# Patient Record
Sex: FEMALE | Race: BLACK OR AFRICAN AMERICAN | Employment: FULL TIME | URBAN - METROPOLITAN AREA
[De-identification: names, ages, dates, MRNs, and addresses within clinical notes are randomized per-mention and may not be internally consistent; named-entity substitution may affect disease eponyms.]

---

## 2023-05-31 ENCOUNTER — HOSPITAL ENCOUNTER (EMERGENCY)
Age: 33
Discharge: HOME | End: 2023-05-31
Payer: OTHER MISCELLANEOUS

## 2023-05-31 VITALS
WEIGHT: 115.3 LBS | OXYGEN SATURATION: 98 % | BODY MASS INDEX: 22.64 KG/M2 | HEIGHT: 60 IN | HEART RATE: 81 BPM | DIASTOLIC BLOOD PRESSURE: 69 MMHG | SYSTOLIC BLOOD PRESSURE: 111 MMHG | TEMPERATURE: 98.2 F | RESPIRATION RATE: 16 BRPM

## 2023-05-31 DIAGNOSIS — S39.012A LUMBAR STRAIN, INITIAL ENCOUNTER: Primary | ICD-10-CM
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
## 2023-05-31 DIAGNOSIS — S40.012A CONTUSION OF LEFT SHOULDER, INITIAL ENCOUNTER: ICD-10-CM

## 2023-05-31 PROCEDURE — 99283 EMERGENCY DEPT VISIT LOW MDM: CPT

## 2023-05-31 RX ORDER — METHOCARBAMOL 500 MG/1
500 TABLET, FILM COATED ORAL EVERY 8 HOURS PRN
Qty: 12 TABLET | Refills: 0 | Status: SHIPPED | OUTPATIENT
Start: 2023-05-31 | End: 2023-06-04

## 2023-05-31 ASSESSMENT — PAIN - FUNCTIONAL ASSESSMENT: PAIN_FUNCTIONAL_ASSESSMENT: NONE - DENIES PAIN

## 2023-05-31 ASSESSMENT — PAIN SCALES - GENERAL: PAINLEVEL_OUTOF10: 6

## 2023-05-31 ASSESSMENT — PAIN DESCRIPTION - ORIENTATION: ORIENTATION: LEFT

## 2023-05-31 ASSESSMENT — PAIN DESCRIPTION - LOCATION: LOCATION: BACK;SHOULDER
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
## 2023-05-31 NOTE — ED NOTES
D/C: Order noted for d/c. Pt confirmed d/c paperwork  have correct name. Discharge and education instructions reviewed with patient. Teach-back successful. Pt verbalized understanding and signed d/c papers. Pt denied questions at this time. No acute distress noted. Patient instructed to follow-up as noted - return to emergency department if symptoms worsen. Patient verbalized understanding. Discharged per EDMD with discharge instructions. Pt discharged to private vehicle. Patient stable upon departure. Thanked patient for choosing [facility]) for care. Provider aware of patient pain at time of discharge.        Cain Mandujano LPN  23/01/37 6121

## 2023-06-08 ENCOUNTER — HOSPITAL ENCOUNTER (EMERGENCY)
Age: 33
Discharge: HOME | End: 2023-06-08
Payer: OTHER MISCELLANEOUS

## 2023-06-08 ENCOUNTER — APPOINTMENT (OUTPATIENT)
Dept: GENERAL RADIOLOGY | Age: 33
End: 2023-06-08
Payer: OTHER MISCELLANEOUS

## 2023-06-08 VITALS
HEART RATE: 65 BPM | HEIGHT: 60 IN | RESPIRATION RATE: 15 BRPM | SYSTOLIC BLOOD PRESSURE: 113 MMHG | WEIGHT: 128.53 LBS | DIASTOLIC BLOOD PRESSURE: 86 MMHG | TEMPERATURE: 97.7 F | BODY MASS INDEX: 25.23 KG/M2 | OXYGEN SATURATION: 100 %

## 2023-06-08 DIAGNOSIS — M62.838 TRAPEZIUS MUSCLE SPASM: Primary | ICD-10-CM

## 2023-06-08 LAB — HCG UR QL: NEGATIVE

## 2023-06-08 PROCEDURE — 73010 X-RAY EXAM OF SHOULDER BLADE: CPT

## 2023-06-08 PROCEDURE — 84703 CHORIONIC GONADOTROPIN ASSAY: CPT

## 2023-06-08 PROCEDURE — 6370000000 HC RX 637 (ALT 250 FOR IP): Performed by: NURSE PRACTITIONER

## 2023-06-08 RX ORDER — ACETAMINOPHEN 500 MG
1000 TABLET ORAL 3 TIMES DAILY PRN
Qty: 180 TABLET | Refills: 0 | Status: SHIPPED | OUTPATIENT
Start: 2023-06-08

## 2023-06-08 RX ORDER — CYCLOBENZAPRINE HCL 10 MG
10 TABLET ORAL ONCE
Status: COMPLETED | OUTPATIENT
Start: 2023-06-08 | End: 2023-06-08

## 2023-06-08 RX ORDER — LIDOCAINE 4 G/G
1 PATCH TOPICAL ONCE
Status: DISCONTINUED | OUTPATIENT
Start: 2023-06-08 | End: 2023-06-08 | Stop reason: HOSPADM

## 2023-06-08 RX ORDER — LIDOCAINE 50 MG/G
1 PATCH TOPICAL DAILY
Qty: 10 PATCH | Refills: 0 | Status: SHIPPED | OUTPATIENT
Start: 2023-06-08 | End: 2023-06-18

## 2023-06-08 RX ORDER — IBUPROFEN 800 MG/1
800 TABLET ORAL EVERY 8 HOURS PRN
Qty: 20 TABLET | Refills: 0 | Status: SHIPPED | OUTPATIENT
Start: 2023-06-08

## 2023-06-08 RX ORDER — CYCLOBENZAPRINE HCL 10 MG
10 TABLET ORAL 3 TIMES DAILY PRN
Qty: 21 TABLET | Refills: 0 | Status: SHIPPED | OUTPATIENT
Start: 2023-06-08 | End: 2023-06-18

## 2023-06-08 RX ORDER — ACETAMINOPHEN 500 MG
1000 TABLET ORAL ONCE
Status: COMPLETED | OUTPATIENT
Start: 2023-06-08 | End: 2023-06-08

## 2023-06-08 RX ORDER — IBUPROFEN 400 MG/1
800 TABLET ORAL ONCE
Status: COMPLETED | OUTPATIENT
Start: 2023-06-08 | End: 2023-06-08

## 2023-06-08 RX ADMIN — ACETAMINOPHEN 1000 MG: 500 TABLET ORAL at 12:56

## 2023-06-08 RX ADMIN — IBUPROFEN 800 MG: 400 TABLET, FILM COATED ORAL at 12:55

## 2023-06-08 RX ADMIN — CYCLOBENZAPRINE 10 MG: 10 TABLET, FILM COATED ORAL at 12:55

## 2023-06-08 ASSESSMENT — ENCOUNTER SYMPTOMS
SORE THROAT: 0
DIARRHEA: 0
ABDOMINAL PAIN: 0
BACK PAIN: 0
NAUSEA: 0
EYE PAIN: 0
VOMITING: 0
SHORTNESS OF BREATH: 0
COUGH: 0

## 2023-06-08 ASSESSMENT — PAIN SCALES - GENERAL
PAINLEVEL_OUTOF10: 2
PAINLEVEL_OUTOF10: 8

## 2023-06-08 ASSESSMENT — PAIN DESCRIPTION - LOCATION: LOCATION: SHOULDER

## 2023-06-08 ASSESSMENT — PAIN DESCRIPTION - ORIENTATION: ORIENTATION: LEFT

## 2023-06-08 NOTE — ED NOTES
Discharge and education instructions reviewed. Patient verbalized understanding, teach-back successful. Patient denied questions at this time. No acute distress noted. Patient instructed to follow-up as noted - return to emergency department if symptoms worsen. Patient verbalized understanding. Discharged per EDMD with discharge instructions.         Karina Cameron RN  06/08/23 7676

## 2023-06-08 NOTE — ED PROVIDER NOTES
well      I am the Primary Clinician of Record. FINAL IMPRESSION      1.  Trapezius muscle spasm          DISPOSITION/PLAN     DISPOSITION Decision To Discharge 06/08/2023 02:05:09 PM      PATIENT REFERRED TO:  Callie Ashley [address]  Schedule an appointment as soon as possible for a visit in 1 week  As needed, If symptoms worsen    [facility] Emergency Department  [address] [address]  [phone]  Go to   As needed, If symptoms worsen    [facility]) Pre-Services  [phone]          DISCHARGE MEDICATIONS:  Discharge Medication List as of 6/8/2023  2:17 PM        START taking these medications    Details   acetaminophen (TYLENOL) 500 MG tablet Take 2 tablets by mouth 3 times daily as needed for Pain, Disp-180 tablet, R-0Normal      ibuprofen (IBU) 800 MG tablet Take 1 tablet by mouth every 8 hours as needed for Pain, Disp-20 tablet, R-0Normal      lidocaine (LIDODERM) 5 % Place 1 patch onto the skin daily for 10 days 12 hours on, 12 hours off., Disp-10 patch, R-0Normal      cyclobenzaprine (FLEXERIL) 10 MG tablet Take 1 tablet by mouth 3 times daily as needed for Muscle spasms, Disp-21 tablet, R-0Normal             DISCONTINUED MEDICATIONS:  Discharge Medication List as of 6/8/2023  2:17 PM                 (Please note that portions of this note were completed with a voice recognition program.  Efforts were made to edit the dictations but occasionally words are mis-transcribed.)    ALICE Mccollum CNP (electronically signed)            ALICE Mccollum CNP  06/08/23 1423
self-care/home management
self-care/home management

## 2023-06-20 ENCOUNTER — OFFICE VISIT (OUTPATIENT)
Dept: ORTHOPEDIC SURGERY | Age: 33
End: 2023-06-20

## 2023-06-20 DIAGNOSIS — M54.12 CERVICAL RADICULAR PAIN: ICD-10-CM
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
## 2023-06-20 DIAGNOSIS — M54.9 ACUTE BACK PAIN, UNSPECIFIED BACK LOCATION, UNSPECIFIED BACK PAIN LATERALITY: ICD-10-CM

## 2023-06-20 DIAGNOSIS — M54.2 NECK PAIN: Primary | ICD-10-CM

## 2023-06-20 RX ORDER — PREDNISONE 10 MG/1
10 TABLET ORAL DAILY
Qty: 10 TABLET | Refills: 0 | Status: SHIPPED | OUTPATIENT
Start: 2023-06-20 | End: 2023-06-30
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
## 2023-08-12 ENCOUNTER — HOSPITAL ENCOUNTER (EMERGENCY)
Age: 33
Discharge: LEFT BEFORE DISCHARGE | End: 2023-08-12

## 2024-10-15 ENCOUNTER — HOSPITAL ENCOUNTER (EMERGENCY)
Age: 34
Discharge: HOME | End: 2024-10-15
Attending: STUDENT IN AN ORGANIZED HEALTH CARE EDUCATION/TRAINING PROGRAM

## 2024-10-15 VITALS
RESPIRATION RATE: 16 BRPM | HEIGHT: 60 IN | TEMPERATURE: 98.4 F | HEART RATE: 87 BPM | WEIGHT: 113.6 LBS | DIASTOLIC BLOOD PRESSURE: 95 MMHG | SYSTOLIC BLOOD PRESSURE: 112 MMHG | OXYGEN SATURATION: 96 % | BODY MASS INDEX: 22.3 KG/M2

## 2024-10-15 DIAGNOSIS — R51.9 ACUTE NONINTRACTABLE HEADACHE, UNSPECIFIED HEADACHE TYPE: Primary | ICD-10-CM

## 2024-10-15 PROCEDURE — 6370000000 HC RX 637 (ALT 250 FOR IP): Performed by: STUDENT IN AN ORGANIZED HEALTH CARE EDUCATION/TRAINING PROGRAM

## 2024-10-15 PROCEDURE — 99283 EMERGENCY DEPT VISIT LOW MDM: CPT

## 2024-10-15 RX ORDER — ACETAMINOPHEN 325 MG/1
650 TABLET ORAL ONCE
Status: COMPLETED | OUTPATIENT
Start: 2024-10-15 | End: 2024-10-15

## 2024-10-15 RX ORDER — IBUPROFEN 400 MG/1
800 TABLET, FILM COATED ORAL ONCE
Status: COMPLETED | OUTPATIENT
Start: 2024-10-15 | End: 2024-10-15

## 2024-10-15 RX ORDER — OLANZAPINE 5 MG/1
5 TABLET, ORALLY DISINTEGRATING ORAL ONCE
Status: COMPLETED | OUTPATIENT
Start: 2024-10-15 | End: 2024-10-15

## 2024-10-15 RX ADMIN — ACETAMINOPHEN 650 MG: 325 TABLET ORAL at 07:43

## 2024-10-15 RX ADMIN — OLANZAPINE 5 MG: 5 TABLET, ORALLY DISINTEGRATING ORAL at 07:43

## 2024-10-15 RX ADMIN — IBUPROFEN 800 MG: 400 TABLET, FILM COATED ORAL at 07:43

## 2024-10-15 ASSESSMENT — PAIN SCALES - GENERAL
PAINLEVEL_OUTOF10: 0
PAINLEVEL_OUTOF10: 0
PAINLEVEL_OUTOF10: 8

## 2024-10-15 ASSESSMENT — PAIN DESCRIPTION - ONSET: ONSET: ON-GOING

## 2024-10-15 ASSESSMENT — PAIN - FUNCTIONAL ASSESSMENT: PAIN_FUNCTIONAL_ASSESSMENT: 0-10

## 2024-10-15 ASSESSMENT — PAIN DESCRIPTION - FREQUENCY: FREQUENCY: CONTINUOUS

## 2024-10-15 ASSESSMENT — PAIN DESCRIPTION - LOCATION: LOCATION: HEAD

## 2024-10-15 NOTE — ED PROVIDER NOTES
THE [facility]  EMERGENCY DEPARTMENT ENCOUNTER          ATTENDING PHYSICIAN NOTE       Date of evaluation: 10/15/2024    Chief Complaint     Headache (Pt c/o headache for the past 3-4 days. Pt tried OTC Tylenol and Excedrin. Hasn't taken meds since yesterday.)      History of Present Illness     Charmayne Hill is a 33 y.o. female who presents presenting with 3 to 4 days of frontal headache.  She states that her family members help get migraines but she has not been formally diagnosed.  She has been having intermittent headaches for the last month and this particular 1 has lasted for about 3 to 4 days.  It seems to go away when she goes to sleep and then is present for the duration of the day.  The pain is in the front of her forehead and does not radiate.  She does complain of photophobia and phonophobia and mild nausea without vomiting.  She denies fevers or chills, blurry vision, chest pain, shortness of breath, or abdominal pain.  She tried taking Tylenol and Excedrin yesterday with minimal improvement    ASSESSMENT / PLAN  (MEDICAL DECISION MAKING)     INITIAL VITALS: BP: (!) 140/92, Temp: 98.4 °F (36.9 °C), Pulse: 71, Respirations: 16, SpO2: 100 %      Charmayne Hill is a 33 y.o. female presenting with 3 to 4 days of frontal headache.    Differential diagnosis includes but is not limited to tension headache, migraine headache, SAH.    Given the chronology of her headache and the fact that has been waxing waning for 3 or 4 days without any neurologic deficit or high risk features, low suspicion for SAH.  No indication for CT imaging today.    Suspect migraine or tension headache.  Will treat with Tylenol, ibuprofen, and p.o. Zydus as well as increase p.o. hydration.    On reassessment, patient reports significant improvement in her symptoms.  She will be discharged home and encouraged to take Tylenol and ibuprofen on a scheduled basis and continue her increased p.o. hydration.  Recommend she follow-up

## 2024-10-15 NOTE — ED NOTES
All discharge paperwork and follow-up instructions reviewed with pt. Pt verbalized understanding. Pt ambulatory upon discharge in stable condition.     Ely Sharpe RN  10/15/24 9264

## 2024-10-23 ENCOUNTER — OFFICE VISIT (OUTPATIENT)
Dept: INTERNAL MEDICINE CLINIC | Age: 34
End: 2024-10-23
Payer: COMMERCIAL

## 2024-10-23 VITALS
OXYGEN SATURATION: 98 % | HEART RATE: 91 BPM | RESPIRATION RATE: 20 BRPM | WEIGHT: 116.8 LBS | TEMPERATURE: 98.2 F | SYSTOLIC BLOOD PRESSURE: 120 MMHG | BODY MASS INDEX: 22.93 KG/M2 | DIASTOLIC BLOOD PRESSURE: 76 MMHG | HEIGHT: 60 IN

## 2024-10-23 DIAGNOSIS — Z12.4 SCREENING FOR CERVICAL CANCER: ICD-10-CM

## 2024-10-23 DIAGNOSIS — G43.809 OTHER MIGRAINE WITHOUT STATUS MIGRAINOSUS, NOT INTRACTABLE: ICD-10-CM

## 2024-10-23 DIAGNOSIS — Z13.9 SCREENING DUE: ICD-10-CM

## 2024-10-23 PROCEDURE — 99213 OFFICE O/P EST LOW 20 MIN: CPT

## 2024-10-23 RX ORDER — SUMATRIPTAN 100 MG/1
50 TABLET, FILM COATED ORAL 2 TIMES DAILY PRN
Qty: 9 TABLET | Refills: 0 | Status: SHIPPED | OUTPATIENT
Start: 2024-10-23

## 2024-10-23 SDOH — ECONOMIC STABILITY: FOOD INSECURITY: WITHIN THE PAST 12 MONTHS, YOU WORRIED THAT YOUR FOOD WOULD RUN OUT BEFORE YOU GOT MONEY TO BUY MORE.: SOMETIMES TRUE

## 2024-10-23 SDOH — ECONOMIC STABILITY: FOOD INSECURITY: WITHIN THE PAST 12 MONTHS, THE FOOD YOU BOUGHT JUST DIDN'T LAST AND YOU DIDN'T HAVE MONEY TO GET MORE.: OFTEN TRUE

## 2024-10-23 SDOH — ECONOMIC STABILITY: INCOME INSECURITY: HOW HARD IS IT FOR YOU TO PAY FOR THE VERY BASICS LIKE FOOD, HOUSING, MEDICAL CARE, AND HEATING?: HARD

## 2024-10-23 SDOH — ECONOMIC STABILITY: TRANSPORTATION INSECURITY
IN THE PAST 12 MONTHS, HAS LACK OF TRANSPORTATION KEPT YOU FROM MEETINGS, WORK, OR FROM GETTING THINGS NEEDED FOR DAILY LIVING?: NO

## 2024-10-23 ASSESSMENT — ENCOUNTER SYMPTOMS
NAUSEA: 0
EYE DISCHARGE: 0
EYE PAIN: 0
SORE THROAT: 0
PHOTOPHOBIA: 1
VOMITING: 0
SHORTNESS OF BREATH: 0
RHINORRHEA: 0
EYE REDNESS: 0
ABDOMINAL PAIN: 0
COUGH: 0

## 2024-10-23 NOTE — PROGRESS NOTES
[facility]      Return in about 5 weeks (around 11/27/2024).    The patient was staffed with teaching attending: Dr. Sergio Ewing.    An electronic signature was used to authenticate this note.    --Arsalan Marie MD

## 2024-10-23 NOTE — PATIENT INSTRUCTIONS
Thank you for visiting the [facility].     The following issues were addressed and changes made as follows:    Please obtain the following blood work prior to your next visit:  Basic metabolic panel  Complete blood count  TSH  HIV, hepatitis C screening  Liver function tests  Vitamin B12, folate levels  Please take a log of your headaches with any relevant exacerbating/relieving factors so we can further help manage at your follow-up visit  Sumatriptan 50 mg.  Please take 1 dose at onset of your headache.  If you are not having any relief within 4 hours, you can take a second dose.  Please do not take more than 2 doses (100 mg) in 1 day.  Please contact us with any concerns or questions about this medication  Referral has been made to Dr. Rosa, OB/GYN.  Please call the number to arrange an appointment for your cervical cancer screening and contact our clinic with any difficulty obtaining this.    Follow up has been arranged for you in 5 weeks.     Please contact the clinic with any questions/concerns or difficulties obtaining your medications.

## 2024-10-24 DIAGNOSIS — Z13.9 SCREENING DUE: ICD-10-CM

## 2024-10-24 LAB
ALBUMIN SERPL-MCNC: 4.7 G/DL (ref 3.4–5)
ALP SERPL-CCNC: 50 U/L (ref 40–129)
ALT SERPL-CCNC: 16 U/L (ref 10–40)
ANION GAP SERPL CALCULATED.3IONS-SCNC: 9 MMOL/L (ref 3–16)
AST SERPL-CCNC: 19 U/L (ref 15–37)
BASOPHILS # BLD: 0 K/UL (ref 0–0.2)
BASOPHILS NFR BLD: 0.7 %
BILIRUB DIRECT SERPL-MCNC: 0.2 MG/DL (ref 0–0.3)
BILIRUB INDIRECT SERPL-MCNC: 0.1 MG/DL (ref 0–1)
BILIRUB SERPL-MCNC: 0.3 MG/DL (ref 0–1)
BUN SERPL-MCNC: 9 MG/DL (ref 7–20)
CALCIUM SERPL-MCNC: 9.6 MG/DL (ref 8.3–10.6)
CHLORIDE SERPL-SCNC: 103 MMOL/L (ref 99–110)
CO2 SERPL-SCNC: 24 MMOL/L (ref 21–32)
CREAT SERPL-MCNC: 0.6 MG/DL (ref 0.6–1.1)
DEPRECATED RDW RBC AUTO: 12.9 % (ref 12.4–15.4)
EOSINOPHIL # BLD: 0.2 K/UL (ref 0–0.6)
EOSINOPHIL NFR BLD: 3.4 %
FOLATE SERPL-MCNC: 14.3 NG/ML (ref 4.78–24.2)
GFR SERPLBLD CREATININE-BSD FMLA CKD-EPI: >90 ML/MIN/{1.73_M2}
GLUCOSE SERPL-MCNC: 86 MG/DL (ref 70–99)
HCT VFR BLD AUTO: 42.9 % (ref 36–48)
HCV AB SERPL QL IA: NORMAL
HGB BLD-MCNC: 15.2 G/DL (ref 12–16)
LYMPHOCYTES # BLD: 2.9 K/UL (ref 1–5.1)
LYMPHOCYTES NFR BLD: 46.8 %
MCH RBC QN AUTO: 34.7 PG (ref 26–34)
MCHC RBC AUTO-ENTMCNC: 35.3 G/DL (ref 31–36)
MCV RBC AUTO: 98.3 FL (ref 80–100)
MONOCYTES # BLD: 0.4 K/UL (ref 0–1.3)
MONOCYTES NFR BLD: 6.4 %
NEUTROPHILS # BLD: 2.6 K/UL (ref 1.7–7.7)
NEUTROPHILS NFR BLD: 42.7 %
PLATELET # BLD AUTO: 270 K/UL (ref 135–450)
PMV BLD AUTO: 8.4 FL (ref 5–10.5)
POTASSIUM SERPL-SCNC: 4.8 MMOL/L (ref 3.5–5.1)
PROT SERPL-MCNC: 7.4 G/DL (ref 6.4–8.2)
RBC # BLD AUTO: 4.37 M/UL (ref 4–5.2)
SODIUM SERPL-SCNC: 136 MMOL/L (ref 136–145)
TSH SERPL DL<=0.005 MIU/L-ACNC: 1.25 UIU/ML (ref 0.27–4.2)
VIT B12 SERPL-MCNC: 570 PG/ML (ref 211–911)
WBC # BLD AUTO: 6.1 K/UL (ref 4–11)

## 2024-10-25 LAB
HIV 1+2 AB+HIV1 P24 AG SERPL QL IA: NORMAL
HIV 2 AB SERPL QL IA: NORMAL
HIV1 AB SERPL QL IA: NORMAL
HIV1 P24 AG SERPL QL IA: NORMAL

## 2024-10-28 ENCOUNTER — TELEPHONE (OUTPATIENT)
Dept: INTERNAL MEDICINE CLINIC | Age: 34
End: 2024-10-28

## 2024-10-28 NOTE — TELEPHONE ENCOUNTER
Pharmacy called wanting clarification on patients Sumatriptan (Imitrex).     Pharmacy can be reached at  [phone].

## 2025-01-20 ENCOUNTER — APPOINTMENT (OUTPATIENT)
Dept: GENERAL RADIOLOGY | Age: 35
End: 2025-01-20

## 2025-01-20 ENCOUNTER — HOSPITAL ENCOUNTER (EMERGENCY)
Age: 35
Discharge: HOME | End: 2025-01-20
Attending: EMERGENCY MEDICINE

## 2025-01-20 VITALS
OXYGEN SATURATION: 100 % | HEART RATE: 69 BPM | DIASTOLIC BLOOD PRESSURE: 67 MMHG | TEMPERATURE: 97.7 F | WEIGHT: 121.9 LBS | BODY MASS INDEX: 23.93 KG/M2 | HEIGHT: 60 IN | SYSTOLIC BLOOD PRESSURE: 117 MMHG | RESPIRATION RATE: 20 BRPM

## 2025-01-20 DIAGNOSIS — M25.551 RIGHT HIP PAIN: Primary | ICD-10-CM

## 2025-01-20 DIAGNOSIS — M54.50 ACUTE BILATERAL LOW BACK PAIN WITHOUT SCIATICA: ICD-10-CM

## 2025-01-20 PROCEDURE — 72100 X-RAY EXAM L-S SPINE 2/3 VWS: CPT

## 2025-01-20 PROCEDURE — 73502 X-RAY EXAM HIP UNI 2-3 VIEWS: CPT

## 2025-01-20 PROCEDURE — 72110 X-RAY EXAM L-2 SPINE 4/>VWS: CPT

## 2025-01-20 PROCEDURE — 6370000000 HC RX 637 (ALT 250 FOR IP)

## 2025-01-20 PROCEDURE — 99283 EMERGENCY DEPT VISIT LOW MDM: CPT

## 2025-01-20 RX ORDER — METHOCARBAMOL 500 MG/1
500 TABLET, FILM COATED ORAL 4 TIMES DAILY
Qty: 40 TABLET | Refills: 0 | Status: SHIPPED | OUTPATIENT
Start: 2025-01-20 | End: 2025-01-30

## 2025-01-20 RX ORDER — LIDOCAINE 50 MG/G
1 PATCH TOPICAL DAILY
Qty: 10 PATCH | Refills: 0 | Status: SHIPPED | OUTPATIENT
Start: 2025-01-20 | End: 2025-01-30

## 2025-01-20 RX ORDER — METHOCARBAMOL 500 MG/1
750 TABLET, FILM COATED ORAL ONCE
Status: COMPLETED | OUTPATIENT
Start: 2025-01-20 | End: 2025-01-20

## 2025-01-20 RX ORDER — IBUPROFEN 600 MG/1
600 TABLET, FILM COATED ORAL EVERY 8 HOURS PRN
Qty: 21 TABLET | Refills: 0 | Status: SHIPPED | OUTPATIENT
Start: 2025-01-20 | End: 2025-01-27

## 2025-01-20 RX ADMIN — METHOCARBAMOL 750 MG: 500 TABLET ORAL at 10:40

## 2025-01-20 RX ADMIN — IBUPROFEN 600 MG: 200 TABLET, FILM COATED ORAL at 10:40

## 2025-01-20 ASSESSMENT — PAIN SCALES - GENERAL
PAINLEVEL_OUTOF10: 7
PAINLEVEL_OUTOF10: 7

## 2025-01-20 ASSESSMENT — PAIN DESCRIPTION - PAIN TYPE: TYPE: ACUTE PAIN

## 2025-01-20 ASSESSMENT — PAIN DESCRIPTION - LOCATION
LOCATION: HIP;BUTTOCKS
LOCATION: HIP

## 2025-01-20 ASSESSMENT — PAIN DESCRIPTION - ONSET: ONSET: ON-GOING

## 2025-01-20 ASSESSMENT — PAIN DESCRIPTION - DESCRIPTORS: DESCRIPTORS: POUNDING

## 2025-01-20 ASSESSMENT — PAIN DESCRIPTION - FREQUENCY: FREQUENCY: CONTINUOUS

## 2025-01-20 ASSESSMENT — PAIN DESCRIPTION - ORIENTATION
ORIENTATION: RIGHT
ORIENTATION: RIGHT;LEFT

## 2025-01-20 ASSESSMENT — PAIN - FUNCTIONAL ASSESSMENT
PAIN_FUNCTIONAL_ASSESSMENT: 0-10
PAIN_FUNCTIONAL_ASSESSMENT: PREVENTS OR INTERFERES WITH MANY ACTIVE NOT PASSIVE ACTIVITIES

## 2025-01-20 NOTE — DISCHARGE INSTRUCTIONS
Exam today and x-ray images are reassuring.  There is no sign of acute fractures or joint injuries.  Do suspect you have symptoms related to recent fall such as muscle strain.  Will treat with multimodal pain management including ibuprofen, muscle relaxer, and topical lidocaine patches.  Work note provided as requested.  Please follow-up with primary care in 1 to 2 weeks if not improving.  Return to ER if you have any worsening or new symptoms of further evaluation if needed.

## 2025-01-20 NOTE — ED PROVIDER NOTES
ED Attending Attestation Note     Date of evaluation: 1/20/2025    This patient was seen by the advance practice provider.  I have seen and examined the patient, agree with the workup, evaluation, management and diagnosis. The care plan has been discussed.  My assessment reveals with a history of \"tailbone fracture in the past who slipped and fell injuring her right side of her hip and back.  This happened a week or so ago.  She has had progressive worsening in pain.  On exam she has no pain with logroll she has tenderness in her right posterior sacroiliac region neurovascularly intact.       Avinash Escalante MD  01/20/25 1047

## 2025-01-20 NOTE — ED PROVIDER NOTES
THE [facility]  EMERGENCY DEPARTMENT ENCOUNTER          NURSE PRACTITIONER NOTE       Date of evaluation: 1/20/2025    Chief Complaint     Hip Pain (Bilateral hip pain x 4 days, right worse than left)      History of Present Illness     Charmayne Hill is a 34 y.o. female who has no significant past medical history aside from infrequent migraines.  She has no headache today.  Who presents complaint of lower back, right hip and right buttock pain that has been worsening over the last week.  She reports on 1/10 she slipped on the ice and fell onto her butt and right side.  She reports previous history of broken tailbone.  Denies any head injury or LOC with fall.  There is no chest or upper extremity trauma.  She initially had pain but doing conservative care at home.  Over time she is not improving but instead worsening she is having more pain with weightbearing and more difficulty with ambulation.  There is no significant bruising or swelling.  She denies any incontinence or saddle anesthesia.  She denies fever or chills.  She denies nausea or vomiting or abdominal pain.  She has no chest pain or palpitations today.    ASSESSMENT / PLAN  (MEDICAL DECISION MAKING)     INITIAL VITALS: BP: 117/67, Temp: 97.7 °F (36.5 °C), Pulse: 69, Respirations: 20, SpO2: 100 %     Briefly, this is a Charmayne Hill 34 y.o. female with a past medical history   Past Medical History:   Diagnosis Date    Heart murmur     who presented to the emergency department with chief complaint of Hip Pain (Bilateral hip pain x 4 days, right worse than left)    Significant history includes fall from slipping on ice a week and a half ago.  Has been ambulating but with worsening pain in right hip and lower back and spine.  Previous history of fractured \"tailbone\".  There is no significant swelling, erythema, or any fevers.  She is having worsening pain with standing while at work and more pain with ambulation.  She denies abdominal pain or

## 2025-03-17 ENCOUNTER — HOSPITAL ENCOUNTER (EMERGENCY)
Age: 35
Discharge: HOME | End: 2025-03-17
Attending: EMERGENCY MEDICINE
Payer: COMMERCIAL

## 2025-03-17 VITALS
RESPIRATION RATE: 18 BRPM | HEART RATE: 84 BPM | WEIGHT: 125.6 LBS | BODY MASS INDEX: 24.66 KG/M2 | TEMPERATURE: 97.7 F | SYSTOLIC BLOOD PRESSURE: 141 MMHG | OXYGEN SATURATION: 100 % | DIASTOLIC BLOOD PRESSURE: 102 MMHG | HEIGHT: 60 IN

## 2025-03-17 DIAGNOSIS — K08.89 PAIN, DENTAL: ICD-10-CM

## 2025-03-17 DIAGNOSIS — K02.9 DENTAL CARIES: Primary | ICD-10-CM

## 2025-03-17 PROCEDURE — 99283 EMERGENCY DEPT VISIT LOW MDM: CPT

## 2025-03-17 RX ORDER — CHLORHEXIDINE GLUCONATE ORAL RINSE 1.2 MG/ML
15 SOLUTION DENTAL 2 TIMES DAILY
Qty: 420 ML | Refills: 0 | Status: SHIPPED | OUTPATIENT
Start: 2025-03-17 | End: 2025-03-31

## 2025-03-17 RX ORDER — PENICILLIN V POTASSIUM 500 MG/1
500 TABLET, FILM COATED ORAL 4 TIMES DAILY
Qty: 28 TABLET | Refills: 0 | Status: SHIPPED | OUTPATIENT
Start: 2025-03-17 | End: 2025-03-24

## 2025-03-17 ASSESSMENT — LIFESTYLE VARIABLES
HOW MANY STANDARD DRINKS CONTAINING ALCOHOL DO YOU HAVE ON A TYPICAL DAY: 1 OR 2
HOW OFTEN DO YOU HAVE A DRINK CONTAINING ALCOHOL: 2-4 TIMES A MONTH

## 2025-03-17 ASSESSMENT — PAIN DESCRIPTION - LOCATION: LOCATION: TEETH

## 2025-03-17 ASSESSMENT — PAIN SCALES - GENERAL: PAINLEVEL_OUTOF10: 10

## 2025-03-17 NOTE — DISCHARGE INSTRUCTIONS
Continue the tylenol and ibuprofen you already have.    If you have Medicaid Insurance:   Your health plan can help you make a next day or same day dental appointment.  The cost of this appointment is covered by your regular health plan benefits!  Please call the below number for your health plan during regular business hours to make a dental appointment.      [facility]   [phone]  [facility]      [phone]  [facility].   [phone]  [facility]     [phone]  [place]     [phone]    If you have trouble getting services through your Medicaid provider, please feel free to call the Medicaid Hotline at [phone].        [place] [facility]  Resources:     [facility]  [address] [place] [address]  [phone]   Hours are M-Th 7:30a-5p; F 7:30-a-1:30p  Emergency walk-ins accepted every weekday 7 am (be there early)  Residency: Resident of [facility]  Must be a resident of the [place]: Bring proof of this residence (example: utility bill);   Sliding Fee Scale  *Scale requires proof of income (example: pay stub or tax return). Scale is based on family size and income. Discounts can be 25%, 50%, 75%, or 100% of all services.   Minimum Co-pay must be $20  Medicaid, Insurance, Self-Pay    [facility]  [address], [place] [id number] [phone]   Hours are M-Th 7:30a-5p; F 7:30-a-1:30p  Emergency walk-ins accepted every weekday 7 am (be there early)  Residency: Resident of [facility]  Must be a resident of the [facility]: Bring proof of this residence (example: utility bill);   Sliding Fee Scale  *Scale requires proof of income (example: pay stub or tax return). Scale is based on family size and income. Discounts can be 25%, 50%, 75%, or 100% of all services.   Minimum Co-pay must be $20  Medicaid, Insurance, Self-Pay    [facility]  [address] [place], [place]

## 2025-05-01 ENCOUNTER — HOSPITAL ENCOUNTER (EMERGENCY)
Age: 35
Discharge: HOME | End: 2025-05-01

## 2025-05-01 VITALS
DIASTOLIC BLOOD PRESSURE: 106 MMHG | HEIGHT: 60 IN | BODY MASS INDEX: 24.64 KG/M2 | SYSTOLIC BLOOD PRESSURE: 141 MMHG | RESPIRATION RATE: 16 BRPM | TEMPERATURE: 97.8 F | HEART RATE: 97 BPM | OXYGEN SATURATION: 98 % | WEIGHT: 125.5 LBS

## 2025-05-01 DIAGNOSIS — M77.11 LATERAL EPICONDYLITIS OF RIGHT ELBOW: Primary | ICD-10-CM

## 2025-05-01 PROCEDURE — 99283 EMERGENCY DEPT VISIT LOW MDM: CPT

## 2025-05-01 RX ORDER — IBUPROFEN 800 MG/1
800 TABLET, FILM COATED ORAL EVERY 8 HOURS PRN
Qty: 30 TABLET | Refills: 0 | Status: SHIPPED | OUTPATIENT
Start: 2025-05-01

## 2025-05-01 ASSESSMENT — PAIN DESCRIPTION - FREQUENCY: FREQUENCY: CONTINUOUS

## 2025-05-01 ASSESSMENT — PAIN DESCRIPTION - LOCATION: LOCATION: ELBOW

## 2025-05-01 ASSESSMENT — PAIN - FUNCTIONAL ASSESSMENT: PAIN_FUNCTIONAL_ASSESSMENT: 0-10

## 2025-05-01 ASSESSMENT — PAIN DESCRIPTION - DESCRIPTORS: DESCRIPTORS: ACHING

## 2025-05-01 ASSESSMENT — PAIN DESCRIPTION - ORIENTATION: ORIENTATION: RIGHT

## 2025-05-01 ASSESSMENT — ENCOUNTER SYMPTOMS
BACK PAIN: 0
COLOR CHANGE: 0

## 2025-05-01 ASSESSMENT — PAIN SCALES - GENERAL: PAINLEVEL_OUTOF10: 8

## 2025-05-01 NOTE — ED PROVIDER NOTES
THE [facility]  EMERGENCY DEPARTMENT ENCOUNTER          PHYSICIAN ASSISTANT NOTE       Date of evaluation: 2025    Chief Complaint     Elbow Pain (Right elbow pain for last several weeks that has gotten progressively worse. Uses arms a lot for work; is right handed. )      History of Present Illness     Charmayne Hill is a 34 y.o. female who presents with right elbow pain ongoing over the past several weeks intermittently.  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  She has taken ibuprofen in the past with some relief.  She denies any specific injury.  She denies prior similar symptoms, pain elsewhere.  She denies any fevers, color change to the area.    Review of Systems     Review of Systems   Constitutional:  Negative for fever.   Musculoskeletal:  Negative for back pain and joint swelling.   Skin:  Negative for color change, rash and wound.   Neurological:  Negative for numbness.       Past Medical, Surgical, Family, and Social History     She has a past medical history of Heart murmur.  She has a past surgical history that includes  section.  Her family history is not on file.  She reports that she has quit smoking. Her smoking use included cigarettes. She has never used smokeless tobacco. She reports current alcohol use. She reports current drug use. Drug: Marijuana (Weed).    Medications     Previous Medications    ASPIRIN-ACETAMINOPHEN-CAFFEINE (EXCEDRIN MIGRAINE) 250-250-65 MG PER TABLET    Take 1 tablet by mouth every 6 hours as needed for Headaches sometimes takes 3-4 tablets at one time; never less than 2 tablets at a time    SUMATRIPTAN (IMITREX) 100 MG TABLET    Take 0.5 tablets by mouth 2 times daily as needed for Migraine Take 50mg and wait at least 4 hours prior to your second dose.       Allergies     She has no known allergies.    Physical Exam     INITIAL VITALS: BP:    Kailash Willis PA-C  05/01/25 1532

## 2025-05-01 NOTE — DISCHARGE INSTRUCTIONS
As we discussed, avoid repetitive activity using your right elbow until symptoms improve.  Take ibuprofen as directed for symptoms.  I have also attached a referral for an orthopedic doctor if symptoms persist.  You can make an appointment with Dr. Foster with the information provided above.  Return to the emergency department sooner with fevers, swelling in your elbow, color change, temperature change in your elbow, injuries, other concerning symptoms.